# Patient Record
Sex: FEMALE | Race: BLACK OR AFRICAN AMERICAN | Employment: FULL TIME | URBAN - METROPOLITAN AREA
[De-identification: names, ages, dates, MRNs, and addresses within clinical notes are randomized per-mention and may not be internally consistent; named-entity substitution may affect disease eponyms.]

---

## 2021-09-22 ENCOUNTER — HOSPITAL ENCOUNTER (OUTPATIENT)
Age: 36
Setting detail: OBSERVATION
Discharge: HOME | End: 2021-09-23
Attending: STUDENT IN AN ORGANIZED HEALTH CARE EDUCATION/TRAINING PROGRAM | Admitting: HOSPITALIST
Payer: MEDICAID

## 2021-09-22 DIAGNOSIS — R42 DIZZINESS: Primary | ICD-10-CM

## 2021-09-22 DIAGNOSIS — R53.1 WEAKNESS: ICD-10-CM

## 2021-09-22 LAB
ABO + RH BLD: NORMAL
ALBUMIN SERPL-MCNC: 3.6 G/DL (ref 3.5–5)
ALBUMIN/GLOB SERPL: 1 {RATIO} (ref 1.1–2.2)
ALP SERPL-CCNC: 62 U/L (ref 45–117)
ALT SERPL-CCNC: 21 U/L (ref 12–78)
ANION GAP SERPL CALC-SCNC: 7 MMOL/L (ref 5–15)
APPEARANCE UR: ABNORMAL
AST SERPL W P-5'-P-CCNC: 16 U/L (ref 15–37)
BACTERIA URNS QL MICRO: ABNORMAL /HPF
BASOPHILS # BLD: 0 K/UL (ref 0–0.1)
BASOPHILS NFR BLD: 1 % (ref 0–1)
BILIRUB SERPL-MCNC: 0.1 MG/DL (ref 0.2–1)
BILIRUB UR QL: NEGATIVE
BLOOD GROUP ANTIBODIES SERPL: NEGATIVE
BUN SERPL-MCNC: 11 MG/DL (ref 6–20)
BUN/CREAT SERPL: 14 (ref 12–20)
CA-I BLD-MCNC: 8.9 MG/DL (ref 8.5–10.1)
CHLORIDE SERPL-SCNC: 107 MMOL/L (ref 97–108)
CO2 SERPL-SCNC: 25 MMOL/L (ref 21–32)
COLOR UR: ABNORMAL
CREAT SERPL-MCNC: 0.79 MG/DL (ref 0.55–1.02)
DIFFERENTIAL METHOD BLD: ABNORMAL
EOSINOPHIL # BLD: 0.2 K/UL (ref 0–0.4)
EOSINOPHIL NFR BLD: 3 % (ref 0–7)
ERYTHROCYTE [DISTWIDTH] IN BLOOD BY AUTOMATED COUNT: 12.3 % (ref 11.5–14.5)
GLOBULIN SER CALC-MCNC: 3.6 G/DL (ref 2–4)
GLUCOSE SERPL-MCNC: 124 MG/DL (ref 65–100)
GLUCOSE UR STRIP.AUTO-MCNC: NEGATIVE MG/DL
HCG SERPL QL: NEGATIVE
HCT VFR BLD AUTO: 36.1 % (ref 35–47)
HGB BLD-MCNC: 11.8 G/DL (ref 11.5–16)
HGB UR QL STRIP: ABNORMAL
HYALINE CASTS URNS QL MICRO: ABNORMAL /LPF (ref 0–5)
IMM GRANULOCYTES # BLD AUTO: 0 K/UL (ref 0–0.04)
IMM GRANULOCYTES NFR BLD AUTO: 0 % (ref 0–0.5)
KETONES UR QL STRIP.AUTO: NEGATIVE MG/DL
LEUKOCYTE ESTERASE UR QL STRIP.AUTO: ABNORMAL
LYMPHOCYTES # BLD: 3.2 K/UL (ref 0.8–3.5)
LYMPHOCYTES NFR BLD: 50 % (ref 12–49)
MCH RBC QN AUTO: 28.2 PG (ref 26–34)
MCHC RBC AUTO-ENTMCNC: 32.7 G/DL (ref 30–36.5)
MCV RBC AUTO: 86.4 FL (ref 80–99)
MONOCYTES # BLD: 0.3 K/UL (ref 0–1)
MONOCYTES NFR BLD: 5 % (ref 5–13)
MUCOUS THREADS URNS QL MICRO: ABNORMAL /LPF
NEUTS SEG # BLD: 2.7 K/UL (ref 1.8–8)
NEUTS SEG NFR BLD: 41 % (ref 32–75)
NITRITE UR QL STRIP.AUTO: NEGATIVE
NRBC # BLD: 0 K/UL (ref 0–0.01)
NRBC BLD-RTO: 0 PER 100 WBC
PH UR STRIP: 5 [PH] (ref 5–8)
PLATELET # BLD AUTO: 299 K/UL (ref 150–400)
PMV BLD AUTO: 11.4 FL (ref 8.9–12.9)
POTASSIUM SERPL-SCNC: 4.2 MMOL/L (ref 3.5–5.1)
PROT SERPL-MCNC: 7.2 G/DL (ref 6.4–8.2)
PROT UR STRIP-MCNC: 100 MG/DL
RBC # BLD AUTO: 4.18 M/UL (ref 3.8–5.2)
RBC #/AREA URNS HPF: >100 /HPF (ref 0–5)
SODIUM SERPL-SCNC: 139 MMOL/L (ref 136–145)
SP GR UR REFRACTOMETRY: 1.03 (ref 1–1.03)
SPECIMEN EXP DATE BLD: NORMAL
UA: UC IF INDICATED,UAUC: ABNORMAL
UROBILINOGEN UR QL STRIP.AUTO: 0.1 EU/DL (ref 0.1–1)
WBC # BLD AUTO: 6.5 K/UL (ref 3.6–11)
WBC URNS QL MICRO: >100 /HPF (ref 0–4)

## 2021-09-22 PROCEDURE — 84703 CHORIONIC GONADOTROPIN ASSAY: CPT

## 2021-09-22 PROCEDURE — 96374 THER/PROPH/DIAG INJ IV PUSH: CPT

## 2021-09-22 PROCEDURE — 36415 COLL VENOUS BLD VENIPUNCTURE: CPT

## 2021-09-22 PROCEDURE — 81001 URINALYSIS AUTO W/SCOPE: CPT

## 2021-09-22 PROCEDURE — 74011250636 HC RX REV CODE- 250/636: Performed by: STUDENT IN AN ORGANIZED HEALTH CARE EDUCATION/TRAINING PROGRAM

## 2021-09-22 PROCEDURE — 80053 COMPREHEN METABOLIC PANEL: CPT

## 2021-09-22 PROCEDURE — 87086 URINE CULTURE/COLONY COUNT: CPT

## 2021-09-22 PROCEDURE — 93005 ELECTROCARDIOGRAM TRACING: CPT

## 2021-09-22 PROCEDURE — 99285 EMERGENCY DEPT VISIT HI MDM: CPT

## 2021-09-22 PROCEDURE — 85025 COMPLETE CBC W/AUTO DIFF WBC: CPT

## 2021-09-22 PROCEDURE — 86901 BLOOD TYPING SEROLOGIC RH(D): CPT

## 2021-09-22 RX ORDER — SODIUM CHLORIDE 9 MG/ML
1000 INJECTION, SOLUTION INTRAVENOUS CONTINUOUS
Status: DISCONTINUED | OUTPATIENT
Start: 2021-09-22 | End: 2021-09-23 | Stop reason: HOSPADM

## 2021-09-22 RX ORDER — MECLIZINE HYDROCHLORIDE 25 MG/1
25 TABLET ORAL
Status: COMPLETED | OUTPATIENT
Start: 2021-09-22 | End: 2021-09-22

## 2021-09-22 RX ADMIN — MECLIZINE HYDROCHLORIDE 25 MG: 25 TABLET ORAL at 22:23

## 2021-09-22 RX ADMIN — SODIUM CHLORIDE 1000 ML: 9 INJECTION, SOLUTION INTRAVENOUS at 22:25

## 2021-09-22 NOTE — Clinical Note
Patient Class[de-identified] OBSERVATION [104]   Type of Bed: Remote Telemetry [29]   Cardiac Monitoring Required?: Yes   Reason for Observation: Dizziness/syncope   Admitting Diagnosis: Dizziness [845715]   Admitting Diagnosis: Syncope [988350]   Admitting Physician: Maylin Kirkland [[id number]]   Attending Physician: Maylin Kirkland [[id number]]

## 2021-09-22 NOTE — LETTER
[facility]Y DEPT  [address] [address]  [phone]    Work/School Note    Date: 9/22/2021    To Whom It May concern:    Lamin Nunez was seen and treated today in the emergency room by the following provider(s):  Attending Provider: Cornelius Limon MD.      Lamin Nunez is excused from work/school on 9/23/2021 through 9/26/2021. She is medically clear to return to work/school on 9/27/2021.         Sincerely,          Dr Emanuel Clock

## 2021-09-23 ENCOUNTER — APPOINTMENT (OUTPATIENT)
Dept: CT IMAGING | Age: 36
End: 2021-09-23
Attending: STUDENT IN AN ORGANIZED HEALTH CARE EDUCATION/TRAINING PROGRAM
Payer: MEDICAID

## 2021-09-23 ENCOUNTER — APPOINTMENT (OUTPATIENT)
Dept: NON INVASIVE DIAGNOSTICS | Age: 36
End: 2021-09-23
Attending: HOSPITALIST
Payer: MEDICAID

## 2021-09-23 VITALS
SYSTOLIC BLOOD PRESSURE: 119 MMHG | HEART RATE: 83 BPM | OXYGEN SATURATION: 100 % | WEIGHT: 181 LBS | BODY MASS INDEX: 30.9 KG/M2 | DIASTOLIC BLOOD PRESSURE: 74 MMHG | RESPIRATION RATE: 16 BRPM | TEMPERATURE: 98 F | HEIGHT: 64 IN

## 2021-09-23 PROBLEM — R42 DIZZINESS: Status: ACTIVE | Noted: 2021-09-23

## 2021-09-23 PROBLEM — R55 SYNCOPE: Status: ACTIVE | Noted: 2021-09-23

## 2021-09-23 LAB
ATRIAL RATE: 61 BPM
CALCULATED P AXIS, ECG09: 35 DEGREES
CALCULATED R AXIS, ECG10: 5 DEGREES
CALCULATED T AXIS, ECG11: 26 DEGREES
DIAGNOSIS, 93000: NORMAL
P-R INTERVAL, ECG05: 142 MS
Q-T INTERVAL, ECG07: 384 MS
QRS DURATION, ECG06: 70 MS
QTC CALCULATION (BEZET), ECG08: 386 MS
TROPONIN I SERPL-MCNC: <0.05 NG/ML
VENTRICULAR RATE, ECG03: 61 BPM

## 2021-09-23 PROCEDURE — 74011250637 HC RX REV CODE- 250/637: Performed by: PSYCHIATRY & NEUROLOGY

## 2021-09-23 PROCEDURE — 96374 THER/PROPH/DIAG INJ IV PUSH: CPT

## 2021-09-23 PROCEDURE — 74011000636 HC RX REV CODE- 636: Performed by: STUDENT IN AN ORGANIZED HEALTH CARE EDUCATION/TRAINING PROGRAM

## 2021-09-23 PROCEDURE — 84484 ASSAY OF TROPONIN QUANT: CPT

## 2021-09-23 PROCEDURE — 99218 HC RM OBSERVATION: CPT

## 2021-09-23 PROCEDURE — 36415 COLL VENOUS BLD VENIPUNCTURE: CPT

## 2021-09-23 PROCEDURE — 74011250636 HC RX REV CODE- 250/636: Performed by: INTERNAL MEDICINE

## 2021-09-23 PROCEDURE — 74011250636 HC RX REV CODE- 250/636: Performed by: PSYCHIATRY & NEUROLOGY

## 2021-09-23 PROCEDURE — 70498 CT ANGIOGRAPHY NECK: CPT

## 2021-09-23 PROCEDURE — 74011250637 HC RX REV CODE- 250/637: Performed by: STUDENT IN AN ORGANIZED HEALTH CARE EDUCATION/TRAINING PROGRAM

## 2021-09-23 PROCEDURE — 70450 CT HEAD/BRAIN W/O DYE: CPT

## 2021-09-23 PROCEDURE — 74011250636 HC RX REV CODE- 250/636: Performed by: STUDENT IN AN ORGANIZED HEALTH CARE EDUCATION/TRAINING PROGRAM

## 2021-09-23 PROCEDURE — 74011000250 HC RX REV CODE- 250: Performed by: INTERNAL MEDICINE

## 2021-09-23 RX ORDER — DIAZEPAM 5 MG/1
5 TABLET ORAL
Status: COMPLETED | OUTPATIENT
Start: 2021-09-23 | End: 2021-09-23

## 2021-09-23 RX ORDER — SODIUM CHLORIDE 0.9 % (FLUSH) 0.9 %
5-40 SYRINGE (ML) INJECTION EVERY 8 HOURS
Status: DISCONTINUED | OUTPATIENT
Start: 2021-09-23 | End: 2021-09-23 | Stop reason: HOSPADM

## 2021-09-23 RX ORDER — MECLIZINE HYDROCHLORIDE 25 MG/1
25 TABLET ORAL 3 TIMES DAILY
Qty: 30 TABLET | Refills: 0 | Status: SHIPPED | OUTPATIENT
Start: 2021-09-23 | End: 2021-10-03

## 2021-09-23 RX ORDER — MECLIZINE HYDROCHLORIDE 25 MG/1
25 TABLET ORAL 3 TIMES DAILY
Status: DISCONTINUED | OUTPATIENT
Start: 2021-09-23 | End: 2021-09-23 | Stop reason: HOSPADM

## 2021-09-23 RX ORDER — SODIUM CHLORIDE 0.9 % (FLUSH) 0.9 %
5-40 SYRINGE (ML) INJECTION AS NEEDED
Status: DISCONTINUED | OUTPATIENT
Start: 2021-09-23 | End: 2021-09-23 | Stop reason: HOSPADM

## 2021-09-23 RX ORDER — SUMATRIPTAN 50 MG/1
50 TABLET, FILM COATED ORAL
Qty: 6 TABLET | Refills: 0 | Status: SHIPPED | OUTPATIENT
Start: 2021-09-23 | End: 2021-09-23

## 2021-09-23 RX ORDER — SUMATRIPTAN 25 MG/1
100 TABLET, FILM COATED ORAL
Status: COMPLETED | OUTPATIENT
Start: 2021-09-23 | End: 2021-09-23

## 2021-09-23 RX ORDER — DOCUSATE SODIUM 100 MG/1
100 CAPSULE, LIQUID FILLED ORAL 2 TIMES DAILY
Status: DISCONTINUED | OUTPATIENT
Start: 2021-09-23 | End: 2021-09-23 | Stop reason: HOSPADM

## 2021-09-23 RX ORDER — ACETAMINOPHEN 325 MG/1
650 TABLET ORAL
Status: DISCONTINUED | OUTPATIENT
Start: 2021-09-23 | End: 2021-09-23 | Stop reason: HOSPADM

## 2021-09-23 RX ORDER — CIPROFLOXACIN 750 MG/1
750 TABLET, FILM COATED ORAL 2 TIMES DAILY
Qty: 14 TABLET | Refills: 0 | Status: SHIPPED | OUTPATIENT
Start: 2021-09-23 | End: 2021-09-30

## 2021-09-23 RX ADMIN — DIAZEPAM 5 MG: 5 TABLET ORAL at 00:23

## 2021-09-23 RX ADMIN — Medication 10 ML: at 17:01

## 2021-09-23 RX ADMIN — IOPAMIDOL 100 ML: 755 INJECTION, SOLUTION INTRAVENOUS at 03:23

## 2021-09-23 RX ADMIN — SODIUM CHLORIDE 1000 ML: 9 INJECTION, SOLUTION INTRAVENOUS at 00:23

## 2021-09-23 RX ADMIN — CEFTRIAXONE 1 G: 1 INJECTION, POWDER, FOR SOLUTION INTRAMUSCULAR; INTRAVENOUS at 09:36

## 2021-09-23 RX ADMIN — MECLIZINE HYDROCHLORIDE 25 MG: 25 TABLET ORAL at 17:00

## 2021-09-23 RX ADMIN — MECLIZINE HYDROCHLORIDE 25 MG: 25 TABLET ORAL at 09:35

## 2021-09-23 RX ADMIN — SUMATRIPTAN SUCCINATE 100 MG: 25 TABLET ORAL at 11:02

## 2021-09-23 NOTE — H&P
History and Physical              Subjective :   Chief Complaint : Vaginal bleeding with severe pain    Source of information : Patient    History of present illness:   79-year-old female presents to the emergency room due to severe pain in the lower abdomen especially with menstrual.'s gets worse. She is complaining of significant pain that got worse, so presented to the emergency room. Pain continued to get worse and was seen by the gynecologist again, they are scheduling her for hysterectomy. But the vaginal bleeding and pain did not get better, taking ibuprofen and Tylenol alternating. Denies any chest pain, palpitations. No loss of consciousness. We will try to make her walk emergency room before discharge she felt dizzy and legs giving out and unable to ambulate. But denies any headache, blurred vision or double vision, no loss of consciousness. Past Medical History:   Diagnosis Date    Migraine      Past Surgical History:   Procedure Laterality Date    HX  SECTION       Family History   Family history unknown: Yes      Social History     Tobacco Use    Smoking status: Never Smoker    Smokeless tobacco: Never Used   Substance Use Topics    Alcohol use: Not Currently       Prior to Admission medications    Not on File     Allergies   Allergen Reactions    Doxycycline Anaphylaxis             Review of Systems:  Constitutional: Appetite is good, denies weight loss, no fever, no chills, no night sweats. Eye: No recent visual disturbances, no discharge, no double vision. Ear/nose/mouth/throat : No hearing disturbance, no ear pain, no nasal congestion, no sore throat, no trouble swallowing. Respiratory : No trouble breathing, no cough, no shortness of breath, no hemoptysis, no wheezing. Cardiovascular : No chest pain, no palpitation,  no orthopnea,  no peripheral edema.   Gastrointestinal : No nausea, no vomiting, no diarrhea, No constipation, No heartburn, No abdominal pain.  Genitourinary : No dysuria, no hematuria, No incontinence. Lymphatics : No swollen glands -Neck, axillary, inguinal.  Endocrine : No excessive thirst, No polyuria No cold intolerance, No heat intolerance. Immunologic :  No seasonal allergies. Musculoskeletal : No joint swelling, No pain, No effusion,   No neck pain. Integumentary : No rash, No pruritus, No ecchymosis. Hematology : No petechiae, No easy bruising,  No tendency to bleed easy. Neurology : Denies change in mental status, No abnormal balance, No headache, No confusion, No numbness or tingling. Psychiatric : No mood swings, No anxiety, No depression. Vitals:     Patient Vitals for the past 12 hrs:   Temp Pulse Resp BP SpO2   09/23/21 0425  82 16 126/63 100 %   09/23/21 0047  74 16 123/64 100 %   09/22/21 2222  78 16 (!) 118/57 100 %   09/22/21 1952 98.3 °F (36.8 °C) 66 16 120/64 100 %       Physical Exam:   General : Well-built, looks comfortable, no respiratory distress noted. HEENT : PERRLA, normal oral mucosa, atraumatic normocephalic, Normal ear and nose. Neck : Supple, no JVD, no masses noted, no carotid bruit. Lungs : Breath sounds with good air entry bilaterally, no wheezes or rales, no accessory muscle use. CVS : Rhythm rate regular, S1+, S2+, no murmur or gallop. Abdomen : Soft, nontender, no organomegaly, bowel sounds active. Extremities : No edema noted,  pedal pulses palpable. Musculoskeletal : Fair range of motion, no joint swelling or effusion, muscle tone and power appears normal.   Skin : Moist, warm,  no pathological rash. Lymphatic : No cervical lymphadenopathy. Neurological : Awake, alert, oriented to time place person. Psychiatric : Mood and affect appears appropriate to the situation.          Data Review:   Recent Results (from the past 24 hour(s))   TYPE & SCREEN    Collection Time: 09/22/21  8:00 PM   Result Value Ref Range    Crossmatch Expiration 09/25/2021,2359     ABO/Rh(D) B Positive     Antibody screen Negative    URINALYSIS W/ REFLEX CULTURE    Collection Time: 09/22/21  8:05 PM    Specimen: Urine   Result Value Ref Range    Color Yellow/Straw      Appearance Turbid (A) Clear      Specific gravity 1.026 1.003 - 1.030      pH (UA) 5.0 5.0 - 8.0      Protein 100 (A) Negative mg/dL    Glucose Negative Negative mg/dL    Ketone Negative Negative mg/dL    Bilirubin Negative Negative      Blood Large (A) Negative      Urobilinogen 0.1 0.1 - 1.0 EU/dL    Nitrites Negative Negative      Leukocyte Esterase Large (A) Negative      WBC >100 (H) 0 - 4 /hpf    RBC >100 (H) 0 - 5 /hpf    Bacteria 1+ (A) Negative /hpf    UA:UC IF INDICATED Urine Culture Ordered (A) Culture not indicated by UA result      Mucus Trace (A) Negative /lpf    Hyaline cast 2-5 0 - 5 /lpf   CBC WITH AUTOMATED DIFF    Collection Time: 09/22/21  8:15 PM   Result Value Ref Range    WBC 6.5 3.6 - 11.0 K/uL    RBC 4.18 3.80 - 5.20 M/uL    HGB 11.8 11.5 - 16.0 g/dL    HCT 36.1 35.0 - 47.0 %    MCV 86.4 80.0 - 99.0 FL    MCH 28.2 26.0 - 34.0 PG    MCHC 32.7 30.0 - 36.5 g/dL    RDW 12.3 11.5 - 14.5 %    PLATELET 276 515 - 687 K/uL    MPV 11.4 8.9 - 12.9 FL    NRBC 0.0 0.0  WBC    ABSOLUTE NRBC 0.00 0.00 - 0.01 K/uL    NEUTROPHILS 41 32 - 75 %    LYMPHOCYTES 50 (H) 12 - 49 %    MONOCYTES 5 5 - 13 %    EOSINOPHILS 3 0 - 7 %    BASOPHILS 1 0 - 1 %    IMMATURE GRANULOCYTES 0 0 - 0.5 %    ABS. NEUTROPHILS 2.7 1.8 - 8.0 K/UL    ABS. LYMPHOCYTES 3.2 0.8 - 3.5 K/UL    ABS. MONOCYTES 0.3 0.0 - 1.0 K/UL    ABS. EOSINOPHILS 0.2 0.0 - 0.4 K/UL    ABS. BASOPHILS 0.0 0.0 - 0.1 K/UL    ABS. IMM.  GRANS. 0.0 0.00 - 0.04 K/UL    DF AUTOMATED     METABOLIC PANEL, COMPREHENSIVE    Collection Time: 09/22/21  8:15 PM   Result Value Ref Range    Sodium 139 136 - 145 mmol/L    Potassium 4.2 3.5 - 5.1 mmol/L    Chloride 107 97 - 108 mmol/L    CO2 25 21 - 32 mmol/L    Anion gap 7 5 - 15 mmol/L    Glucose 124 (H) 65 - 100 mg/dL    BUN 11 6 - 20 mg/dL    Creatinine 0.79 0.55 - 1.02 mg/dL    BUN/Creatinine ratio 14 12 - 20      GFR est AA >60 >60 ml/min/1.73m2    GFR est non-AA >60 >60 ml/min/1.73m2    Calcium 8.9 8.5 - 10.1 mg/dL    Bilirubin, total 0.1 (L) 0.2 - 1.0 mg/dL    AST (SGOT) 16 15 - 37 U/L    ALT (SGPT) 21 12 - 78 U/L    Alk. phosphatase 62 45 - 117 U/L    Protein, total 7.2 6.4 - 8.2 g/dL    Albumin 3.6 3.5 - 5.0 g/dL    Globulin 3.6 2.0 - 4.0 g/dL    A-G Ratio 1.0 (L) 1.1 - 2.2     HCG QL SERUM    Collection Time: 09/22/21  8:15 PM   Result Value Ref Range    HCG, Ql. Negative Negative         Radiologic Studies :   CT Results  (Last 48 hours)               09/23/21 0323  CTA HEAD NECK W CONT Final result    Impression:  No occlusion or high-grade stenosis of the major cervical or intracranial   arterial vasculature. Please note that degrees of carotid stenosis are measured in accordance with   NASCET criteria. Narrative:  Study: Head and Neck CTA       Clinical Indication: Dizziness. Comparison: Head CT performed earlier the same day. Technique: Routine unenhanced axial acquisition of the neck was performed. Substernally, contiguous thin section axial acquisition of the head and neck was   performed in the arterial phase from the thoracic inlet to the skull vertex   following the intravenous administration of 100 mL Isovue-370. Coronal,   sagittal, and MIP reconstructions were generated and reviewed. Dose reduction:   All CT scans at this facility are performed using dose reduction optimization   techniques as appropriate to a performed exam including the following-automated   exposure control, adjustments of mA and/or Kv according to patient size, or use   of iterative reconstructive technique. Findings:       Neck CTA:   The aortic arch and great vessel origins are unremarkable. The common carotid arteries are patent without high-grade stenosis. The carotid   bifurcations are unremarkable.  The cervical internal carotid arteries are patent   without high-grade stenosis based on NASCET criteria. The external carotid   arteries are unremarkable. The cervical vertebral arteries are patent without high-grade stenosis. No dissection or aneurysm/pseudoaneurysm. The paraspinal soft tissues are unremarkable. The lung apices are clear. No   destructive osseous lesion. Head CTA:   The intracranial internal carotid arteries are patent without high-grade   stenosis. The anterior cerebral arteries are patent without high-grade stenosis. The middle cerebral arteries are patent without high-grade stenosis. The right   posterior communicating artery is present. The intracranial vertebral arteries are patent without high-grade stenosis. The   basilar artery is patent without high-grade stenosis. The posterior cerebral   arteries are patent without high-grade stenosis. No aneurysm or high flow vascular malformation. 09/23/21 0151  CT HEAD WO CONT Final result    Impression:  [Normal noncontrast CT of the head]         Narrative:  HISTORY: headache/dizziness   Dose reduction technique: All CT scans at this facility are performed using dose reduction optimization   technique as appropriate on the exam including the following: Automated exposure   control, adjustment of the MA and/or KV according to patient size and/or use of   iterative reconstructive technique.        TECHNIQUE:  Head CT without contrast   COMPARISON: None   LIMITATIONS: [None]       BRAIN: [Normal gray/white matter differentiation.] [No acute intracranial   hemorrhage, mass effect or midline shift.]   VENTRICLES: [No hydrocephalus]   EXTRA-AXIAL SPACES / SULCI: [No hemorrhages, fluid collections, or masses]   CALVARIUM/SKULL BASE: [Normal]   FACE/SINUSES: [Visualized portions normal]   SOFT TISSUES: [Normal]       OTHER: [None]               CXR Results  (Last 48 hours)    None            Assessment and Plan :     Dizziness: No evidence of acute bleed at this time. Hemoglobin fairly stable. But it is worse with activity unable to ambulate. Vaginal bleeding: Likely menorrhagia but her hemoglobin appears fairly good. Suspected urinary tract infection, started on Rocephin in the emergency room which I will continue    As patient unable to ambulate admitted for further evaluation, requested neurology consultation, had CT and CTA done in the emergency room that was essentially normal.    Full CODE STATUS, no home medications, no advanced medical directives. CC : Stephanie Hicks MD  Signed By: Amparo Munguia MD     September 23, 2021      This dictation was done by dragon, computer voice recognition software. Often unanticipated grammatical, syntax, [employer] phones and other interpretive errors are inadvertently transcribed. Please excuse errors that have escaped final proofreading.

## 2021-09-23 NOTE — PROGRESS NOTES
9/23/21. Pt informed of SON/OBS notice, verbalized understanding, & signed. Copy to pt, copy in chart, & original to HIM for scanning into EMR. D/C Plan is home with her kids & her father will transport her home. Pt uses no DME/no home health. Pt listed as Self Pay - stated she gave insurance cards to registration & cards were scanned- registration informed.

## 2021-09-23 NOTE — CONSULTS
NEUROLOGY CONSULT    Name Jaylin Carpenter Age 39 y.o. MRN [id number]  1985     Referring Physician: Sunita Gaines MD      Chief Complaint: Dizziness     This is a 39 y.o. right handed -American female who is currently admitted because of severe pain in the lower abdomen but also reported dizziness described as a spinning of things around her head she woke up with yesterday morning. She felt nauseous once but no vomiting. She denied any change in the speech or vision. She denied any tinnitus but does get dizzy if she turns in the bed to the left or right. She feels unsteady on her feet and also has right-sided throbbing headache associated with photo and phonophobia. She has history of migraines in the past.  She came yesterday evening and a CT scan of the head in the ED was essentially unremarkable    Assessment and Plan:  1. Peripheral vertigo: The patient's history strongly indicate above benign positional paroxysmal vertigo and she does not need any additional work-up other than the CT scan already done. Agree with the meclizine and continue it as scheduled 3 times daily dosing. 2.  Migraine headache: She currently has a bad migraine which started yesterday. It is slightly better today but still there. I will order Imitrex tablet to treat it. The patient can be discharged from neurology standpoint on Imitrex and meclizine. If the vertigo persists she can see an ENT specialist.    Thank you for the consult.     Allergies   Allergen Reactions    Doxycycline Anaphylaxis     Current Facility-Administered Medications   Medication Dose Route Frequency    sodium chloride (NS) flush 5-40 mL  5-40 mL IntraVENous Q8H    sodium chloride (NS) flush 5-40 mL  5-40 mL IntraVENous PRN    acetaminophen (TYLENOL) tablet 650 mg  650 mg Oral Q6H PRN    docusate sodium (COLACE) capsule 100 mg  100 mg Oral BID    cefTRIAXone (ROCEPHIN) 1 g in sterile water (preservative free) 10 mL IV syringe  1 g IntraVENous Q24H    0.9% sodium chloride infusion 1,000 mL  1,000 mL IntraVENous CONTINUOUS     No current outpatient medications on file. Past Medical History:   Diagnosis Date    Migraine      Social History     Tobacco Use    Smoking status: Never Smoker    Smokeless tobacco: Never Used   Substance Use Topics    Alcohol use: Not Currently    Drug use: Not Currently     Exam  Visit Vitals  /69   Pulse 81   Temp 98 °F (36.7 °C)   Resp 16   Ht 5' 4\" (1.626 m)   Wt 82.1 kg (181 lb)   LMP 08/08/2021   SpO2 100%   BMI 31.07 kg/m²     General: Well developed, well nourished. Patient in no distress   Head: Normocephalic, atraumatic, anicteric sclera   Neck Normal ROM, No thyromegally   Lungs:  Clear to auscultation    Cardiac: Regular rate and rhythm with no murmurs. Abd: Bowel sounds were audible   Ext: No pedal edema   Skin: Supple no rash     NeurologicExam:    Mental Status: Alert and oriented to person place and time   Speech: Fluent no aphasia or dysarthria. Cranial Nerves:   Pupils are equal round and reactive to light and accommodation, extraocular movements are intact and full, visual fields are intact by confrontation, no nystagmus noted, face is symmetric, sensation on face is intact and symmetric, hearing is intact and symmetric, tongue and uvula are in midline with normal movements, palate is elevating equally, shoulder shrug is intact and symmetric. Motor:  Full and symmetric strength of upper and lower ext . Normal bulk and tone. Reflexes:   Deep tendon reflexes 2+/4 and symmetric. Sensory:   Symmetric and intact    Gait:  Gait is deferred   Tremor:   No tremor noted. Cerebellar:  Coordination intact for finger-nose-finger. Neurovascular: No carotid bruits.  No JVD      Lab Review  Lab Results   Component Value Date/Time    WBC 6.5 09/22/2021 08:15 PM    HCT 36.1 09/22/2021 08:15 PM    HGB 11.8 09/22/2021 08:15 PM    PLATELET 428 82/65/0359 08:15 PM     Lab Results   Component Value Date/Time    Sodium 139 09/22/2021 08:15 PM    Potassium 4.2 09/22/2021 08:15 PM    Chloride 107 09/22/2021 08:15 PM    CO2 25 09/22/2021 08:15 PM    Glucose 124 (H) 09/22/2021 08:15 PM    BUN 11 09/22/2021 08:15 PM    Creatinine 0.79 09/22/2021 08:15 PM    Calcium 8.9 09/22/2021 08:15 PM     No results found for: B12LT, FOL, RBCF  No results found for: LDL, LDLC, DLDLP  No results found for: HBA1C, BDK1ADQV, LMJ0BHWK  No components found for: TROPQUANT  No results found for: YENNY

## 2021-09-23 NOTE — DISCHARGE SUMMARY
Physician Discharge Summary     Patient ID:    Chris Ann  [id number]  79 y.o.  1985    Admit date: 9/22/2021    Discharge date : 9/23/2021    Chronic Diagnoses:    Problem List as of 9/23/2021 Date Reviewed: 9/23/2021        Codes Class Noted - Resolved    Dizziness ICD-10-CM: R42  ICD-9-CM: 780.4  9/23/2021 - Present        Syncope ICD-10-CM: R55  ICD-9-CM: 780.2  9/23/2021 - Present          22    Final Diagnoses:   Dizziness [R42]  Syncope [R55]    Reason for Hospitalization:    Vaginal bleeding and headaches    Hospital Course:    Patient is a 40 yo with a past medical history of migraines and chronic vaginal bleeding presents with dizziness and headache. She came to ED after she was unable to walk more than 5-10 steps due to dizziness that started yesterday on 9/22. She says that the dizziness is episodic and associated with moving or when she lays on her side. It is better when she lies flat. She had some nausea yesterday. Patient has had extensive work up by gynecologist for mild continuous vaginal bleeding and pelvic region discomfort and has a planned hysterectomy. Her hemoglobin was fairly stable at 11.8. She denies recent URI, hearing loss, and tinnitus. Patient was started on Meclizine yesterday with improvement in headaches   CT of head wo showed normal findings. CTA Head and neck showed no occlusion or high grade stenosis of the major cervical or intracranial arterial vasculature.      UA suggested UTI. She was started on rocephin in Ed and will be dischared on oral ciprofloxacin              Discharge Medications:   Current Discharge Medication List      START taking these medications    Details   meclizine (ANTIVERT) 25 mg tablet Take 1 Tablet by mouth three (3) times daily for 10 days.   Qty: 30 Tablet, Refills: 0  Start date: 9/23/2021, End date: 10/3/2021      SUMAtriptan (Imitrex) 50 mg tablet Take 1 Tablet by mouth once as needed for Migraine for up to 1 dose.  Qty: 6 Tablet, Refills: 0  Start date: 2021, End date: 2021      ciprofloxacin HCl (CIPRO) 750 mg tablet Take 1 Tablet by mouth two (2) times a day for 7 days. Qty: 14 Tablet, Refills: 0  Start date: 2021, End date: 2021               Follow up Care:    1. Stephanie Hicks MD in 1-2 weeks. Please call to set up an appointment shortly after discharge. Diet:  Regular Diet    Disposition:  Home. Advanced Directive:   FULL    DNR      Discharge Exam:  Visit Vitals  /68   Pulse 86   Temp 98 °F (36.7 °C)   Resp 16   Ht 5' 4\" (1.626 m)   Wt 82.1 kg (181 lb)   LMP 2021   SpO2 99%   BMI 31.07 kg/m²      O2 Device: None (Room air)    Temp (24hrs), Av.2 °F (36.8 °C), Min:98 °F (36.7 °C), Max:98.3 °F (36.8 °C)    No intake/output data recorded. No intake/output data recorded. General:  Alert, cooperative, no distress, appears stated age. Lungs:   Clear to auscultation bilaterally. Chest wall:  No tenderness or deformity. Heart:  Regular rate and rhythm, S1, S2 normal, no murmur, click, rub or gallop. Abdomen:   Soft, non-tender. Bowel sounds normal. No masses,  No organomegaly. Extremities: Extremities normal, atraumatic, no cyanosis or edema. Pulses: 2+ and symmetric all extremities. Skin: Skin color, texture, turgor normal. No rashes or lesions   Neurologic: CNII-XII intact. No gross sensory or motor deficits         CONSULTATIONS: Neurology    Significant Diagnostic Studies:   2021: BUN 11 mg/dL (Ref range: 6 - 20 mg/dL); Calcium 8.9 mg/dL (Ref range: 8.5 - 10.1 mg/dL); CO2 25 mmol/L (Ref range: 21 - 32 mmol/L); Creatinine 0.79 mg/dL (Ref range: 0.55 - 1.02 mg/dL); Glucose 124 mg/dL* (Ref range: 65 - 100 mg/dL); HCT 36.1 % (Ref range: 35.0 - 47.0 %); HGB 11.8 g/dL (Ref range: 11.5 - 16.0 g/dL); Potassium 4.2 mmol/L (Ref range: 3.5 - 5.1 mmol/L);  Sodium 139 mmol/L (Ref range: 136 - 145 mmol/L)  Recent Labs     21   WBC 6.5   HGB 11.8 HCT 36.1        Recent Labs     09/22/21 2015      K 4.2      CO2 25   BUN 11   CREA 0.79   *   CA 8.9     Recent Labs     09/22/21 2015   ALT 21   AP 62   TBILI 0.1*   TP 7.2   ALB 3.6   GLOB 3.6     No results for input(s): INR, PTP, APTT, INREXT in the last 72 hours. No results for input(s): FE, TIBC, PSAT, FERR in the last 72 hours. No results for input(s): PH, PCO2, PO2 in the last 72 hours. No results for input(s): CPK, CKMB in the last 72 hours.     No lab exists for component: TROPONINI  No results found for: Mehreen Braun    Total Time: 35 minutes    Signed:  Anahi Montez MD  9/23/2021  2:49 PM

## 2021-09-23 NOTE — ED NOTES
Spoke with Dr Rosalva Almaraz about second troponin not being completed yet. Dr Rosalva Almaraz said it was okay to go ahead and discharge pt without second trop.

## 2021-09-23 NOTE — MED STUDENT NOTES
Hospitalist Progress Note         Luisana Medel          Daily Progress Note: 9/23/2021      Subjective: The patient is seen for follow up. Patient is a 38 yo with a past medical history of migraines and chronic vaginal bleeding presents with dizziness and headache. She came to ED after she was unable to walk more than 5-10 steps due to dizziness that started yesterday on 9/22. She says that the dizziness is episodic and associated with moving or when she lays on her side. It is better when she lies flat. She had some nausea yesterday. Patient has had extensive work up by gynecologist for mild continuous vaginal bleeding and pelvic region discomfort and has a planned hysterectomy. Her hemoglobin was fairly stable at 11.8. She denies recent URI, hearing loss, and tinnitus. Patient was started on Meclizine yesterday and will continue per neurology. She developed a headache yesterday on the left frontal region and radiates to side of head. She denies pain behind eye. Pt states that it feels different than her migraines from the past. Patient started on Imitrex per neurology. CT of head wo showed normal findings. CTA Head and neck showed no occlusion or high grade stenosis of the major cervical or intracranial arterial vasculature.      UA suggested UTI and patient was started on Rocephin in ED      Problem List:  Problem List as of 9/23/2021 Date Reviewed: 9/23/2021        Codes Class Noted - Resolved    Dizziness ICD-10-CM: R42  ICD-9-CM: 780.4  9/23/2021 - Present        Syncope ICD-10-CM: R55  ICD-9-CM: 780.2  9/23/2021 - Present              Medications reviewed  Current Facility-Administered Medications   Medication Dose Route Frequency    sodium chloride (NS) flush 5-40 mL  5-40 mL IntraVENous Q8H    sodium chloride (NS) flush 5-40 mL  5-40 mL IntraVENous PRN    acetaminophen (TYLENOL) tablet 650 mg  650 mg Oral Q6H PRN    docusate sodium (COLACE) capsule 100 mg  100 mg Oral BID    cefTRIAXone (ROCEPHIN) 1 g in sterile water (preservative free) 10 mL IV syringe  1 g IntraVENous Q24H    meclizine (ANTIVERT) tablet 25 mg  25 mg Oral TID    0.9% sodium chloride infusion 1,000 mL  1,000 mL IntraVENous CONTINUOUS     No current outpatient medications on file. Review of Systems:   A comprehensive review of systems was negative except for that written in the HPI. Objective:   Physical Exam:     Visit Vitals  /68   Pulse 86   Temp 98 °F (36.7 °C)   Resp 16   Ht 5' 4\" (1.626 m)   Wt 181 lb (82.1 kg)   LMP 2021   SpO2 99%   BMI 31.07 kg/m²      O2 Device: None (Room air)    Temp (24hrs), Av.2 °F (36.8 °C), Min:98 °F (36.7 °C), Max:98.3 °F (36.8 °C)    No intake/output data recorded. No intake/output data recorded. General:  Alert, cooperative, no distress, appears stated age. Lungs:   Clear to auscultation bilaterally. Chest wall:  No tenderness or deformity. Heart:  Regular rate and rhythm, S1, S2 normal, no murmur, click, rub or gallop. Abdomen:   Soft, non-tender. Bowel sounds normal. No masses,  No organomegaly. Extremities: Extremities normal, atraumatic, no cyanosis or edema. Pulses: 2+ and symmetric all extremities. Skin: Skin color, texture, turgor normal. No rashes or lesions   Neurologic: CNII-XII intact. No gross sensory or motor deficits     Data Review:       Recent Days:  Recent Labs     21   WBC 6.5   HGB 11.8   HCT 36.1        Recent Labs     21      K 4.2      CO2 25   *   BUN 11   CREA 0.79   CA 8.9   ALB 3.6   TBILI 0.1*   ALT 21     No results for input(s): PH, PCO2, PO2, HCO3, FIO2 in the last 72 hours.     24 Hour Results:  Recent Results (from the past 24 hour(s))   TYPE & SCREEN    Collection Time: 21  8:00 PM   Result Value Ref Range    Crossmatch Expiration 2021,2359     ABO/Rh(D) B Positive     Antibody screen Negative    URINALYSIS W/ REFLEX CULTURE Collection Time: 09/22/21  8:05 PM    Specimen: Urine   Result Value Ref Range    Color Yellow/Straw      Appearance Turbid (A) Clear      Specific gravity 1.026 1.003 - 1.030      pH (UA) 5.0 5.0 - 8.0      Protein 100 (A) Negative mg/dL    Glucose Negative Negative mg/dL    Ketone Negative Negative mg/dL    Bilirubin Negative Negative      Blood Large (A) Negative      Urobilinogen 0.1 0.1 - 1.0 EU/dL    Nitrites Negative Negative      Leukocyte Esterase Large (A) Negative      WBC >100 (H) 0 - 4 /hpf    RBC >100 (H) 0 - 5 /hpf    Bacteria 1+ (A) Negative /hpf    UA:UC IF INDICATED Urine Culture Ordered (A) Culture not indicated by UA result      Mucus Trace (A) Negative /lpf    Hyaline cast 2-5 0 - 5 /lpf   CBC WITH AUTOMATED DIFF    Collection Time: 09/22/21  8:15 PM   Result Value Ref Range    WBC 6.5 3.6 - 11.0 K/uL    RBC 4.18 3.80 - 5.20 M/uL    HGB 11.8 11.5 - 16.0 g/dL    HCT 36.1 35.0 - 47.0 %    MCV 86.4 80.0 - 99.0 FL    MCH 28.2 26.0 - 34.0 PG    MCHC 32.7 30.0 - 36.5 g/dL    RDW 12.3 11.5 - 14.5 %    PLATELET 204 685 - 927 K/uL    MPV 11.4 8.9 - 12.9 FL    NRBC 0.0 0.0  WBC    ABSOLUTE NRBC 0.00 0.00 - 0.01 K/uL    NEUTROPHILS 41 32 - 75 %    LYMPHOCYTES 50 (H) 12 - 49 %    MONOCYTES 5 5 - 13 %    EOSINOPHILS 3 0 - 7 %    BASOPHILS 1 0 - 1 %    IMMATURE GRANULOCYTES 0 0 - 0.5 %    ABS. NEUTROPHILS 2.7 1.8 - 8.0 K/UL    ABS. LYMPHOCYTES 3.2 0.8 - 3.5 K/UL    ABS. MONOCYTES 0.3 0.0 - 1.0 K/UL    ABS. EOSINOPHILS 0.2 0.0 - 0.4 K/UL    ABS. BASOPHILS 0.0 0.0 - 0.1 K/UL    ABS. IMM.  GRANS. 0.0 0.00 - 0.04 K/UL    DF AUTOMATED     METABOLIC PANEL, COMPREHENSIVE    Collection Time: 09/22/21  8:15 PM   Result Value Ref Range    Sodium 139 136 - 145 mmol/L    Potassium 4.2 3.5 - 5.1 mmol/L    Chloride 107 97 - 108 mmol/L    CO2 25 21 - 32 mmol/L    Anion gap 7 5 - 15 mmol/L    Glucose 124 (H) 65 - 100 mg/dL    BUN 11 6 - 20 mg/dL    Creatinine 0.79 0.55 - 1.02 mg/dL    BUN/Creatinine ratio 14 12 - 20 GFR est AA >60 >60 ml/min/1.73m2    GFR est non-AA >60 >60 ml/min/1.73m2    Calcium 8.9 8.5 - 10.1 mg/dL    Bilirubin, total 0.1 (L) 0.2 - 1.0 mg/dL    AST (SGOT) 16 15 - 37 U/L    ALT (SGPT) 21 12 - 78 U/L    Alk. phosphatase 62 45 - 117 U/L    Protein, total 7.2 6.4 - 8.2 g/dL    Albumin 3.6 3.5 - 5.0 g/dL    Globulin 3.6 2.0 - 4.0 g/dL    A-G Ratio 1.0 (L) 1.1 - 2.2     HCG QL SERUM    Collection Time: 09/22/21  8:15 PM   Result Value Ref Range    HCG, Ql. Negative Negative             Assessment/   Peripheral vertigo likely benign positional paroxysmal vertigo   Acute headache - suspected migraine   Chronic vaginal bleeding   Urinary tract infection       Plan:  Continue meclizine 25mg po tid for vertigo   Consider Epley's maneuver to treat BPPV  Imitrex 100mg po 1x given for migraine   Continue IV rocephin until discharge then switch to cirpofloxacin 500mg po every day x3  Consult PT for vestibular exercises   Discharge with follow up to ENT if vertigo persists    Care Plan discussed with: Patient/Family    Total time spent with patient: 30 minutes. Sabrina Hernandez  *ATTENTION:  This note has been created by a medical student for educational purposes only. Please do not refer to the content of this note for clinical decision-making, billing, or other purposes. Please see attending physicians note to obtain clinical information on this patient. *

## 2021-09-23 NOTE — ED PROVIDER NOTES
EMERGENCY DEPARTMENT HISTORY AND PHYSICAL EXAM      Date: 2021  Patient Name: Victor Manuel Vicente      History of Presenting Illness     Chief Complaint   Patient presents with    Dizziness    Vaginal Bleeding       History Provided By: Patient    HPI: Victor Manuel Vicente, 39 y.o. female with no chronic PMH of note presenting for evaluation of vaginal bleeding and dizziness. Vaginal bleeding: Appears to be chronic, patient has been having this issue for months and has been seen by her gynecologist and had extensive work-up including routine blood work, thyroid function tests, urinalysis with culture, cervical and vaginal biopsies, and transvaginal ultrasound and results are unremarkable. Patient presents with continuous mild bleeding. Patient is concerned that she does have anemia from the extent of the bleeding she is been having over the last few months. However, reports that her work-up before has been unrevealing. Dizziness: Been progressive over the last week, orthostatic and sometimes when she is turning her head, sensation of things are spinning and that she has been a fall, no associated numbness, weakness, headache, changes in vision, chest pain, shortness of breath, nausea, vomiting, or diaphoresis. Patient disclosed to staff that she has abdominal pain. However, on further clarification, patient denies abdominal pain reports that she has discomfort in her pelvic area that has been going for months and have been worked up by her gynecologist.  Of note, patient reports that she has planned hysterectomy due to the ongoing issue of bleeding and pain. There are no other complaints, changes, or physical findings at this time.     PCP: Casey Coleman MD        Past History     Past Medical History:  Past Medical History:   Diagnosis Date    Migraine        Past Surgical History:  Past Surgical History:   Procedure Laterality Date    HX  SECTION         Family History:  Family History   Family history unknown: Yes       Social History:  Social History     Tobacco Use    Smoking status: Never Smoker    Smokeless tobacco: Never Used   Substance Use Topics    Alcohol use: Not Currently    Drug use: Not Currently       Allergies: Allergies   Allergen Reactions    Doxycycline Anaphylaxis         Review of Systems     Review of Systems   Constitutional: Negative for activity change, chills and fever. HENT: Negative for congestion and facial swelling. Eyes: Negative for discharge and visual disturbance. Respiratory: Negative for chest tightness and shortness of breath. Cardiovascular: Negative for chest pain and leg swelling. Gastrointestinal: Negative for abdominal pain, diarrhea and vomiting. Genitourinary: Positive for vaginal bleeding. Negative for dysuria and flank pain. Musculoskeletal: Negative for back pain and gait problem. Skin: Negative for rash and wound. Neurological: Positive for dizziness. Negative for weakness and headaches. Physical Exam     Physical Exam  Constitutional:       Appearance: Normal appearance. She is normal weight. HENT:      Head: Normocephalic and atraumatic. Mouth/Throat:      Mouth: Mucous membranes are moist.      Pharynx: Oropharynx is clear. Eyes:      Extraocular Movements: Extraocular movements intact. Right eye: No nystagmus. Left eye: No nystagmus. Cardiovascular:      Rate and Rhythm: Normal rate and regular rhythm. Pulmonary:      Effort: Pulmonary effort is normal.      Breath sounds: Normal breath sounds. Abdominal:      General: Abdomen is flat. Palpations: Abdomen is soft. Tenderness: There is no abdominal tenderness. Musculoskeletal:         General: Normal range of motion. Cervical back: Normal range of motion and neck supple. Right lower leg: No edema. Left lower leg: No edema. Neurological:      Mental Status: She is alert and oriented to person, place, and time.       Cranial Nerves: No cranial nerve deficit. Sensory: No sensory deficit. Motor: No weakness. Coordination: Coordination normal.         Lab and Diagnostic Study Results     Labs -     Recent Results (from the past 12 hour(s))   TYPE & SCREEN    Collection Time: 09/22/21  8:00 PM   Result Value Ref Range    Crossmatch Expiration 09/25/2021,2359     ABO/Rh(D) B Positive     Antibody screen Negative    URINALYSIS W/ REFLEX CULTURE    Collection Time: 09/22/21  8:05 PM    Specimen: Urine   Result Value Ref Range    Color Yellow/Straw      Appearance Turbid (A) Clear      Specific gravity 1.026 1.003 - 1.030      pH (UA) 5.0 5.0 - 8.0      Protein 100 (A) Negative mg/dL    Glucose Negative Negative mg/dL    Ketone Negative Negative mg/dL    Bilirubin Negative Negative      Blood Large (A) Negative      Urobilinogen 0.1 0.1 - 1.0 EU/dL    Nitrites Negative Negative      Leukocyte Esterase Large (A) Negative      WBC >100 (H) 0 - 4 /hpf    RBC >100 (H) 0 - 5 /hpf    Bacteria 1+ (A) Negative /hpf    UA:UC IF INDICATED Urine Culture Ordered (A) Culture not indicated by UA result      Mucus Trace (A) Negative /lpf    Hyaline cast 2-5 0 - 5 /lpf   CBC WITH AUTOMATED DIFF    Collection Time: 09/22/21  8:15 PM   Result Value Ref Range    WBC 6.5 3.6 - 11.0 K/uL    RBC 4.18 3.80 - 5.20 M/uL    HGB 11.8 11.5 - 16.0 g/dL    HCT 36.1 35.0 - 47.0 %    MCV 86.4 80.0 - 99.0 FL    MCH 28.2 26.0 - 34.0 PG    MCHC 32.7 30.0 - 36.5 g/dL    RDW 12.3 11.5 - 14.5 %    PLATELET 667 091 - 619 K/uL    MPV 11.4 8.9 - 12.9 FL    NRBC 0.0 0.0  WBC    ABSOLUTE NRBC 0.00 0.00 - 0.01 K/uL    NEUTROPHILS 41 32 - 75 %    LYMPHOCYTES 50 (H) 12 - 49 %    MONOCYTES 5 5 - 13 %    EOSINOPHILS 3 0 - 7 %    BASOPHILS 1 0 - 1 %    IMMATURE GRANULOCYTES 0 0 - 0.5 %    ABS. NEUTROPHILS 2.7 1.8 - 8.0 K/UL    ABS. LYMPHOCYTES 3.2 0.8 - 3.5 K/UL    ABS. MONOCYTES 0.3 0.0 - 1.0 K/UL    ABS. EOSINOPHILS 0.2 0.0 - 0.4 K/UL    ABS.  BASOPHILS 0.0 0.0 - 0.1 K/UL    ABS. IMM. GRANS. 0.0 0.00 - 0.04 K/UL    DF AUTOMATED     METABOLIC PANEL, COMPREHENSIVE    Collection Time: 09/22/21  8:15 PM   Result Value Ref Range    Sodium 139 136 - 145 mmol/L    Potassium 4.2 3.5 - 5.1 mmol/L    Chloride 107 97 - 108 mmol/L    CO2 25 21 - 32 mmol/L    Anion gap 7 5 - 15 mmol/L    Glucose 124 (H) 65 - 100 mg/dL    BUN 11 6 - 20 mg/dL    Creatinine 0.79 0.55 - 1.02 mg/dL    BUN/Creatinine ratio 14 12 - 20      GFR est AA >60 >60 ml/min/1.73m2    GFR est non-AA >60 >60 ml/min/1.73m2    Calcium 8.9 8.5 - 10.1 mg/dL    Bilirubin, total 0.1 (L) 0.2 - 1.0 mg/dL    AST (SGOT) 16 15 - 37 U/L    ALT (SGPT) 21 12 - 78 U/L    Alk. phosphatase 62 45 - 117 U/L    Protein, total 7.2 6.4 - 8.2 g/dL    Albumin 3.6 3.5 - 5.0 g/dL    Globulin 3.6 2.0 - 4.0 g/dL    A-G Ratio 1.0 (L) 1.1 - 2.2     HCG QL SERUM    Collection Time: 09/22/21  8:15 PM   Result Value Ref Range    HCG, Ql. Negative Negative         Radiologic Studies -   [unfilled]  CT Results  (Last 48 hours)               09/23/21 0323  CTA HEAD NECK W CONT Final result    Impression:  No occlusion or high-grade stenosis of the major cervical or intracranial   arterial vasculature. Please note that degrees of carotid stenosis are measured in accordance with   NASCET criteria. Narrative:  Study: Head and Neck CTA       Clinical Indication: Dizziness. Comparison: Head CT performed earlier the same day. Technique: Routine unenhanced axial acquisition of the neck was performed. Substernally, contiguous thin section axial acquisition of the head and neck was   performed in the arterial phase from the thoracic inlet to the skull vertex   following the intravenous administration of 100 mL Isovue-370. Coronal,   sagittal, and MIP reconstructions were generated and reviewed.  Dose reduction:   All CT scans at this facility are performed using dose reduction optimization   techniques as appropriate to a performed exam including the following-automated   exposure control, adjustments of mA and/or Kv according to patient size, or use   of iterative reconstructive technique. Findings:       Neck CTA:   The aortic arch and great vessel origins are unremarkable. The common carotid arteries are patent without high-grade stenosis. The carotid   bifurcations are unremarkable. The cervical internal carotid arteries are patent   without high-grade stenosis based on NASCET criteria. The external carotid   arteries are unremarkable. The cervical vertebral arteries are patent without high-grade stenosis. No dissection or aneurysm/pseudoaneurysm. The paraspinal soft tissues are unremarkable. The lung apices are clear. No   destructive osseous lesion. Head CTA:   The intracranial internal carotid arteries are patent without high-grade   stenosis. The anterior cerebral arteries are patent without high-grade stenosis. The middle cerebral arteries are patent without high-grade stenosis. The right   posterior communicating artery is present. The intracranial vertebral arteries are patent without high-grade stenosis. The   basilar artery is patent without high-grade stenosis. The posterior cerebral   arteries are patent without high-grade stenosis. No aneurysm or high flow vascular malformation. 09/23/21 0151  CT HEAD WO CONT Final result    Impression:  [Normal noncontrast CT of the head]         Narrative:  HISTORY: headache/dizziness   Dose reduction technique: All CT scans at this facility are performed using dose reduction optimization   technique as appropriate on the exam including the following: Automated exposure   control, adjustment of the MA and/or KV according to patient size and/or use of   iterative reconstructive technique.        TECHNIQUE:  Head CT without contrast   COMPARISON: None   LIMITATIONS: [None]       BRAIN: [Normal gray/white matter differentiation.] [No acute intracranial   hemorrhage, mass effect or midline shift.]   VENTRICLES: [No hydrocephalus]   EXTRA-AXIAL SPACES / SULCI: [No hemorrhages, fluid collections, or masses]   CALVARIUM/SKULL BASE: [Normal]   FACE/SINUSES: [Visualized portions normal]   SOFT TISSUES: [Normal]       OTHER: [None]               CXR Results  (Last 48 hours)    None          Medical Decision Making and ED Course   - I am the first and primary provider for this patient AND AM THE PRIMARY PROVIDER OF RECORD. - I reviewed the vital signs, available nursing notes, past medical history, past surgical history, family history and social history. - Initial assessment performed. The patients presenting problems have been discussed, and the staff are in agreement with the care plan formulated and outlined with them. I have encouraged them to ask questions as they arise throughout their visit. Vital Signs-Reviewed the patient's vital signs. Patient Vitals for the past 12 hrs:   Temp Pulse Resp BP SpO2   09/23/21 0637 98 °F (36.7 °C) 81 16 114/69 100 %   09/23/21 0425  82 16 126/63 100 %   09/23/21 0047  74 16 123/64 100 %   09/22/21 2222  78 16 (!) 118/57 100 %   09/22/21 1952 98.3 °F (36.8 °C) 66 16 120/64 100 %         Records Reviewed: Nursing Notes    Provider Notes (Medical Decision Making):   24-year-old female presenting with dizziness and vaginal bleeding. #Dizziness: Dizziness appears to be orthostatic and somewhat positional.  Does not appear to be secondary to a central etiology. Could be secondary to peripheral etiology versus orthostasis. Patient reports that her dizziness sometimes gets triggered by standing up and sometimes by turning her head. This favors peripheral and orthostatic rather than central etiology. Patient is not having any other symptoms suggestive of cardiac or pulmonary etiology.   However, will obtain CBC to rule out anemia given history of vaginal bleeding, chemistry, type and screen, and get an EKG and if the patient remains fluid and reassess her. Given the possibility of peripheral etiology we will give the patient meclizine as well. #Vaginal bleeding: Appears to be chronic, mild in severity, has an extensive work-up as an outpatient. Patient does have planned hysterectomy which make me think that this probably due to fibroids. Patient abdominal examination is reassuring. She does not appear pale. However, pending the results of her work-up in the ED to determine if the patient would need confusion or not. ED Course:   I did review the patient work-up. Her CBC within normal, chemistry mild hyperglycemia but otherwise unremarkable, negative pregnancy test, urinalysis showed WBCs and RBCs which is secondary to her vaginal bleeding. Patient is not having symptoms of UTI. #Dizziness: Patient did receive meclizine and Valium without improvement of her symptoms. Thus, CVA was considered in the differential although outside the window. I did get CT of the head which was unremarkable this was followed by CTA of head and neck which did not show signs of acute vascular occlusion. Patient did receive 2 L of normal saline. I did attempt ambulating the patient twice and was unsuccessful. Patient appears generally weak and unsteady when she standing up. Patient will need to be admitted for further evaluation of this dizziness. #Vaginal bleeding: Patient is hemodynamics appear to be normal.  I do not think the vaginal bleeding is a cause of dizziness and patient does not appear to be volume down. Will defer further work-up for vaginal leading to gynecology. Disposition     Disposition: Condition stable  Admitted to Floor Medical Floor the case was discussed with the admitting physician Dr. Raven Castro    Admitted    Diagnosis     Clinical Impression:   1. Dizziness    2. Weakness        Attestations:     Jack Hart MD    Please note that this dictation was completed with viktor Castro computer voice recognition software. Quite often unanticipated grammatical, syntax, homophones, and other interpretive errors are inadvertently transcribed by the computer software. Please disregard these errors. Please excuse any errors that have escaped final proofreading. Thank you.

## 2021-09-24 ENCOUNTER — TRANSCRIBE ORDER (OUTPATIENT)
Dept: SCHEDULING | Age: 36
End: 2021-09-24

## 2021-09-24 DIAGNOSIS — R10.9 ABDOMINAL PAIN: Primary | ICD-10-CM

## 2021-09-24 LAB
BACTERIA SPEC CULT: NORMAL
COLONY COUNT,CNT: NORMAL
COLONY COUNT,CNT: NORMAL
SPECIAL REQUESTS,SREQ: NORMAL

## 2021-09-30 ENCOUNTER — HOSPITAL ENCOUNTER (OUTPATIENT)
Dept: ULTRASOUND IMAGING | Age: 36
Discharge: HOME | End: 2021-09-30
Attending: INTERNAL MEDICINE
Payer: MEDICAID

## 2021-09-30 DIAGNOSIS — R10.9 ABDOMINAL PAIN: ICD-10-CM

## 2021-09-30 PROCEDURE — 76700 US EXAM ABDOM COMPLETE: CPT

## 2022-03-19 PROBLEM — R42 DIZZINESS: Status: ACTIVE | Noted: 2021-09-23

## 2022-03-19 PROBLEM — R55 SYNCOPE: Status: ACTIVE | Noted: 2021-09-23

## 2024-01-26 ENCOUNTER — APPOINTMENT (OUTPATIENT)
Facility: HOSPITAL | Age: 39
End: 2024-01-26
Payer: COMMERCIAL

## 2024-01-26 ENCOUNTER — HOSPITAL ENCOUNTER (EMERGENCY)
Facility: HOSPITAL | Age: 39
Discharge: HOME | End: 2024-01-26
Attending: EMERGENCY MEDICINE
Payer: COMMERCIAL

## 2024-01-26 VITALS
HEIGHT: 64 IN | WEIGHT: 190 LBS | RESPIRATION RATE: 17 BRPM | TEMPERATURE: 98.3 F | OXYGEN SATURATION: 100 % | DIASTOLIC BLOOD PRESSURE: 80 MMHG | HEART RATE: 91 BPM | SYSTOLIC BLOOD PRESSURE: 139 MMHG | BODY MASS INDEX: 32.44 KG/M2

## 2024-01-26 DIAGNOSIS — J20.9 ACUTE BRONCHITIS, UNSPECIFIED ORGANISM: ICD-10-CM

## 2024-01-26 DIAGNOSIS — J40 BRONCHITIS: Primary | ICD-10-CM

## 2024-01-26 PROCEDURE — 71045 X-RAY EXAM CHEST 1 VIEW: CPT

## 2024-01-26 PROCEDURE — 99283 EMERGENCY DEPT VISIT LOW MDM: CPT

## 2024-01-26 RX ORDER — METHYLPREDNISOLONE 4 MG/1
TABLET ORAL
Qty: 1 KIT | Refills: 0 | Status: SHIPPED | OUTPATIENT
Start: 2024-01-26 | End: 2024-02-01

## 2024-01-26 RX ORDER — ALBUTEROL SULFATE 90 UG/1
2 AEROSOL, METERED RESPIRATORY (INHALATION) 4 TIMES DAILY PRN
Qty: 18 G | Refills: 0 | Status: SHIPPED | OUTPATIENT
Start: 2024-01-26

## 2024-01-26 RX ORDER — BENZONATATE 100 MG/1
100 CAPSULE ORAL 2 TIMES DAILY PRN
Qty: 20 CAPSULE | Refills: 0 | Status: SHIPPED | OUTPATIENT
Start: 2024-01-26 | End: 2024-02-02

## 2024-01-26 ASSESSMENT — PAIN SCALES - GENERAL: PAINLEVEL_OUTOF10: 3

## 2024-01-26 ASSESSMENT — PAIN - FUNCTIONAL ASSESSMENT: PAIN_FUNCTIONAL_ASSESSMENT: 0-10

## 2024-01-26 NOTE — ED PROVIDER NOTES
for Wheezing 18 g 0       Social Determinants of Health:   Social Determinants of Health     Tobacco Use: Low Risk  (1/26/2024)    Patient History     Smoking Tobacco Use: Never     Smokeless Tobacco Use: Never     Passive Exposure: Not on file   Alcohol Use: Not on file   Financial Resource Strain: Not on file   Food Insecurity: Not on file   Transportation Needs: Not on file   Physical Activity: Not on file   Stress: Not on file   Social Connections: Not on file   Intimate Partner Violence: Not on file   Depression: Not on file   Housing Stability: Not on file   Interpersonal Safety: Not on file   Utilities: Not on file       PHYSICAL EXAM   Physical Exam  Constitutional:       Appearance: Normal appearance.   HENT:      Head: Normocephalic and atraumatic.      Right Ear: External ear normal.      Left Ear: External ear normal.      Nose: Nose normal.      Mouth/Throat:      Mouth: Mucous membranes are moist.   Eyes:      Extraocular Movements: Extraocular movements intact.      Conjunctiva/sclera: Conjunctivae normal.      Pupils: Pupils are equal, round, and reactive to light.   Cardiovascular:      Rate and Rhythm: Normal rate and regular rhythm.      Pulses: Normal pulses.      Heart sounds: Normal heart sounds.   Pulmonary:      Effort: Pulmonary effort is normal.      Breath sounds: Normal breath sounds.   Abdominal:      General: Abdomen is flat. Bowel sounds are normal.      Palpations: Abdomen is soft.   Musculoskeletal:         General: No swelling, tenderness or signs of injury. Normal range of motion.      Cervical back: Normal range of motion and neck supple.   Skin:     General: Skin is warm and dry.      Capillary Refill: Capillary refill takes less than 2 seconds.   Neurological:      General: No focal deficit present.      Mental Status: She is alert and oriented to person, place, and time.   Psychiatric:         Mood and Affect: Mood normal.         Behavior: Behavior normal.         Thought

## 2024-01-26 NOTE — ED TRIAGE NOTES
Pt states that she started getting chest discomfort especially to touch and cough since she had the flu back in Dec.

## 2024-02-26 ENCOUNTER — HOSPITAL ENCOUNTER (EMERGENCY)
Facility: HOSPITAL | Age: 39
Discharge: HOME | End: 2024-02-26
Payer: COMMERCIAL

## 2024-02-26 VITALS
HEART RATE: 75 BPM | DIASTOLIC BLOOD PRESSURE: 74 MMHG | TEMPERATURE: 98 F | BODY MASS INDEX: 32.61 KG/M2 | OXYGEN SATURATION: 99 % | WEIGHT: 191 LBS | SYSTOLIC BLOOD PRESSURE: 124 MMHG | RESPIRATION RATE: 16 BRPM | HEIGHT: 64 IN

## 2024-02-26 DIAGNOSIS — M67.432 GANGLION CYST OF VOLAR ASPECT OF LEFT WRIST: Primary | ICD-10-CM

## 2024-02-26 PROCEDURE — 99283 EMERGENCY DEPT VISIT LOW MDM: CPT

## 2024-02-26 RX ORDER — NAPROXEN 500 MG/1
500 TABLET ORAL 2 TIMES DAILY
Qty: 30 TABLET | Refills: 0 | Status: SHIPPED | OUTPATIENT
Start: 2024-02-26

## 2024-02-26 ASSESSMENT — PAIN - FUNCTIONAL ASSESSMENT: PAIN_FUNCTIONAL_ASSESSMENT: NONE - DENIES PAIN

## 2024-02-26 NOTE — ED PROVIDER NOTES
Tobacco Use: Never     Smokeless Tobacco Use: Never     Passive Exposure: Not on file   Alcohol Use: Not on file   Financial Resource Strain: Not on file   Food Insecurity: Not on file   Transportation Needs: Not on file   Physical Activity: Not on file   Stress: Not on file   Social Connections: Not on file   Intimate Partner Violence: Not on file   Depression: Not on file   Housing Stability: Not on file   Interpersonal Safety: Not on file   Utilities: Not on file       PHYSICAL EXAM   Physical Exam  Vitals and nursing note reviewed.   Constitutional:       General: She is not in acute distress.     Appearance: Normal appearance. She is not toxic-appearing or diaphoretic.   HENT:      Head: Normocephalic and atraumatic.      Nose: Nose normal.      Mouth/Throat:      Mouth: Mucous membranes are moist.      Pharynx: Oropharynx is clear.   Eyes:      Extraocular Movements: Extraocular movements intact.      Conjunctiva/sclera: Conjunctivae normal.      Pupils: Pupils are equal, round, and reactive to light.   Cardiovascular:      Rate and Rhythm: Normal rate and regular rhythm.      Pulses: Normal pulses.   Pulmonary:      Effort: Pulmonary effort is normal.   Abdominal:      General: Abdomen is flat. There is no distension.      Palpations: Abdomen is soft.      Tenderness: There is no abdominal tenderness. There is no guarding.   Musculoskeletal:         General: Normal range of motion.      Cervical back: Normal range of motion and neck supple.      Comments: Nontender focal area of swelling noted to the volar aspect of the left wrist approximately 1.5 cm in diameter, soft nontender to palpation no warmth, erythema or surrounding induration appreciated.  Negative Tinel's over carpal tunnel.   Skin:     General: Skin is warm and dry.      Capillary Refill: Capillary refill takes less than 2 seconds.   Neurological:      General: No focal deficit present.      Mental Status: She is alert and oriented to person,

## 2024-11-03 ENCOUNTER — HOSPITAL ENCOUNTER (EMERGENCY)
Facility: HOSPITAL | Age: 39
Discharge: HOME | End: 2024-11-03
Attending: STUDENT IN AN ORGANIZED HEALTH CARE EDUCATION/TRAINING PROGRAM
Payer: COMMERCIAL

## 2024-11-03 ENCOUNTER — APPOINTMENT (OUTPATIENT)
Facility: HOSPITAL | Age: 39
End: 2024-11-03
Payer: COMMERCIAL

## 2024-11-03 VITALS
RESPIRATION RATE: 18 BRPM | SYSTOLIC BLOOD PRESSURE: 119 MMHG | OXYGEN SATURATION: 100 % | HEIGHT: 64 IN | DIASTOLIC BLOOD PRESSURE: 59 MMHG | TEMPERATURE: 98.2 F | WEIGHT: 168 LBS | HEART RATE: 84 BPM | BODY MASS INDEX: 28.68 KG/M2

## 2024-11-03 DIAGNOSIS — M79.671 HEEL PAIN, CHRONIC, RIGHT: Primary | ICD-10-CM

## 2024-11-03 DIAGNOSIS — G89.29 HEEL PAIN, CHRONIC, RIGHT: Primary | ICD-10-CM

## 2024-11-03 PROCEDURE — 99283 EMERGENCY DEPT VISIT LOW MDM: CPT

## 2024-11-03 PROCEDURE — 73630 X-RAY EXAM OF FOOT: CPT

## 2024-11-03 PROCEDURE — 6370000000 HC RX 637 (ALT 250 FOR IP): Performed by: STUDENT IN AN ORGANIZED HEALTH CARE EDUCATION/TRAINING PROGRAM

## 2024-11-03 RX ORDER — LIDOCAINE 4 G/G
1 PATCH TOPICAL DAILY
Qty: 30 PATCH | Refills: 0 | Status: SHIPPED | OUTPATIENT
Start: 2024-11-03 | End: 2024-12-03

## 2024-11-03 RX ORDER — ACETAMINOPHEN 500 MG
1000 TABLET ORAL
Status: COMPLETED | OUTPATIENT
Start: 2024-11-03 | End: 2024-11-03

## 2024-11-03 RX ORDER — IBUPROFEN 600 MG/1
600 TABLET, FILM COATED ORAL 3 TIMES DAILY PRN
Qty: 30 TABLET | Refills: 0 | Status: SHIPPED | OUTPATIENT
Start: 2024-11-03

## 2024-11-03 RX ADMIN — ACETAMINOPHEN 1000 MG: 500 TABLET ORAL at 08:48

## 2024-11-03 ASSESSMENT — PAIN SCALES - GENERAL
PAINLEVEL_OUTOF10: 8
PAINLEVEL_OUTOF10: 10

## 2024-11-03 ASSESSMENT — LIFESTYLE VARIABLES
HOW MANY STANDARD DRINKS CONTAINING ALCOHOL DO YOU HAVE ON A TYPICAL DAY: PATIENT DOES NOT DRINK
HOW OFTEN DO YOU HAVE A DRINK CONTAINING ALCOHOL: NEVER

## 2024-11-03 ASSESSMENT — PAIN - FUNCTIONAL ASSESSMENT: PAIN_FUNCTIONAL_ASSESSMENT: 0-10

## 2024-11-03 ASSESSMENT — PAIN DESCRIPTION - ORIENTATION: ORIENTATION: RIGHT

## 2024-11-03 ASSESSMENT — PAIN DESCRIPTION - LOCATION: LOCATION: ANKLE

## 2024-11-03 NOTE — ED PROVIDER NOTES
of Record: John Mckeon MD (electronically signed)    (Please note that parts of this dictation were completed with voice recognition software. Quite often unanticipated grammatical, syntax, homophones, and other interpretive errors are inadvertently transcribed by the computer software. Please disregards these errors. Please excuse any errors that have escaped final proofreading.)     John Mckeon MD  11/03/24 0922